# Patient Record
Sex: FEMALE | Race: WHITE | URBAN - METROPOLITAN AREA
[De-identification: names, ages, dates, MRNs, and addresses within clinical notes are randomized per-mention and may not be internally consistent; named-entity substitution may affect disease eponyms.]

---

## 2021-04-20 ENCOUNTER — OFFICE (OUTPATIENT)
Dept: URBAN - METROPOLITAN AREA CLINIC 75 | Facility: CLINIC | Age: 51
End: 2021-04-20

## 2021-04-20 VITALS
DIASTOLIC BLOOD PRESSURE: 90 MMHG | WEIGHT: 145 LBS | TEMPERATURE: 97.3 F | HEIGHT: 67 IN | SYSTOLIC BLOOD PRESSURE: 138 MMHG

## 2021-04-20 DIAGNOSIS — K59.00 CONSTIPATION, UNSPECIFIED: ICD-10-CM

## 2021-04-20 DIAGNOSIS — K64.9 UNSPECIFIED HEMORRHOIDS: ICD-10-CM

## 2021-04-20 DIAGNOSIS — R68.81 EARLY SATIETY: ICD-10-CM

## 2021-04-20 DIAGNOSIS — R14.2 ERUCTATION: ICD-10-CM

## 2021-04-20 DIAGNOSIS — K62.5 HEMORRHAGE OF ANUS AND RECTUM: ICD-10-CM

## 2021-04-20 PROCEDURE — 99204 OFFICE O/P NEW MOD 45 MIN: CPT | Performed by: INTERNAL MEDICINE

## 2021-04-20 RX ORDER — LINACLOTIDE 145 UG/1
145 CAPSULE, GELATIN COATED ORAL
Qty: 90 | Refills: 3 | Status: COMPLETED
Start: 2021-04-20 | End: 2021-04-21 | Stop reason: CLARIF

## 2021-04-27 VITALS
SYSTOLIC BLOOD PRESSURE: 127 MMHG | SYSTOLIC BLOOD PRESSURE: 137 MMHG | OXYGEN SATURATION: 100 % | WEIGHT: 145 LBS | TEMPERATURE: 95.2 F | DIASTOLIC BLOOD PRESSURE: 82 MMHG | DIASTOLIC BLOOD PRESSURE: 95 MMHG | RESPIRATION RATE: 10 BRPM | HEART RATE: 64 BPM | RESPIRATION RATE: 16 BRPM | HEART RATE: 70 BPM | OXYGEN SATURATION: 98 % | RESPIRATION RATE: 23 BRPM | RESPIRATION RATE: 18 BRPM | DIASTOLIC BLOOD PRESSURE: 92 MMHG | DIASTOLIC BLOOD PRESSURE: 96 MMHG | HEART RATE: 67 BPM | HEART RATE: 66 BPM | RESPIRATION RATE: 11 BRPM | SYSTOLIC BLOOD PRESSURE: 124 MMHG | SYSTOLIC BLOOD PRESSURE: 119 MMHG | SYSTOLIC BLOOD PRESSURE: 126 MMHG | HEIGHT: 67 IN | RESPIRATION RATE: 9 BRPM | OXYGEN SATURATION: 99 % | SYSTOLIC BLOOD PRESSURE: 148 MMHG | RESPIRATION RATE: 32 BRPM | TEMPERATURE: 98.3 F | SYSTOLIC BLOOD PRESSURE: 147 MMHG | DIASTOLIC BLOOD PRESSURE: 89 MMHG | SYSTOLIC BLOOD PRESSURE: 146 MMHG | SYSTOLIC BLOOD PRESSURE: 140 MMHG | RESPIRATION RATE: 22 BRPM | SYSTOLIC BLOOD PRESSURE: 155 MMHG | SYSTOLIC BLOOD PRESSURE: 128 MMHG | SYSTOLIC BLOOD PRESSURE: 133 MMHG | HEART RATE: 73 BPM | HEART RATE: 81 BPM | DIASTOLIC BLOOD PRESSURE: 85 MMHG | HEART RATE: 68 BPM | HEART RATE: 75 BPM | DIASTOLIC BLOOD PRESSURE: 83 MMHG | DIASTOLIC BLOOD PRESSURE: 77 MMHG | OXYGEN SATURATION: 97 % | DIASTOLIC BLOOD PRESSURE: 87 MMHG | DIASTOLIC BLOOD PRESSURE: 93 MMHG | DIASTOLIC BLOOD PRESSURE: 80 MMHG

## 2021-05-03 ENCOUNTER — OFFICE (OUTPATIENT)
Dept: URBAN - METROPOLITAN AREA PATHOLOGY 4 | Facility: PATHOLOGY | Age: 51
End: 2021-05-03

## 2021-05-03 ENCOUNTER — AMBULATORY SURGICAL CENTER (OUTPATIENT)
Dept: URBAN - METROPOLITAN AREA SURGERY 17 | Facility: SURGERY | Age: 51
End: 2021-05-03

## 2021-05-03 DIAGNOSIS — K62.5 HEMORRHAGE OF ANUS AND RECTUM: ICD-10-CM

## 2021-05-03 DIAGNOSIS — R14.2 ERUCTATION: ICD-10-CM

## 2021-05-03 DIAGNOSIS — K63.5 POLYP OF COLON: ICD-10-CM

## 2021-05-03 DIAGNOSIS — K59.00 CONSTIPATION, UNSPECIFIED: ICD-10-CM

## 2021-05-03 DIAGNOSIS — K21.00 GASTRO-ESOPHAGEAL REFLUX DISEASE WITH ESOPHAGITIS, WITHOUT B: ICD-10-CM

## 2021-05-03 DIAGNOSIS — R68.81 EARLY SATIETY: ICD-10-CM

## 2021-05-03 DIAGNOSIS — K31.89 OTHER DISEASES OF STOMACH AND DUODENUM: ICD-10-CM

## 2021-05-03 LAB
GI HISTOLOGY: A. SELECT: (no result)
GI HISTOLOGY: B. UNSPECIFIED: (no result)
GI HISTOLOGY: C. UNSPECIFIED: (no result)
GI HISTOLOGY: D. UNSPECIFIED: (no result)
GI HISTOLOGY: PDF REPORT: (no result)

## 2021-05-03 PROCEDURE — 88305 TISSUE EXAM BY PATHOLOGIST: CPT | Performed by: INTERNAL MEDICINE

## 2021-05-03 PROCEDURE — 88342 IMHCHEM/IMCYTCHM 1ST ANTB: CPT | Performed by: INTERNAL MEDICINE

## 2021-05-03 PROCEDURE — 45380 COLONOSCOPY AND BIOPSY: CPT | Performed by: INTERNAL MEDICINE

## 2021-05-03 PROCEDURE — 43239 EGD BIOPSY SINGLE/MULTIPLE: CPT | Performed by: INTERNAL MEDICINE

## 2021-05-03 RX ORDER — OMEPRAZOLE 40 MG/1
40 CAPSULE, DELAYED RELEASE ORAL
Qty: 90 | Refills: 3 | Status: COMPLETED
Start: 2021-05-03 | End: 2021-06-04

## 2021-05-03 NOTE — SERVICEHPINOTES
CARMEN HANLEY  is a  50  female   who presents today for a  EGD-Colonoscopy   for   the indications listed below. The updated Patient Profile was reviewed prior to the procedure, in conjunction with the Physical Exam, including medical conditions, surgical procedures, medications, allergies, family history and social history. See Physical Exam time stamp below for date and time of HPI completion.Pre-operatively, I reviewed the indication(s) for the procedure, the risks of the procedure [including but not limited to: unexpected bleeding possibly requiring hospitalization and/or unplanned repeat procedures, perforation possibly requiring surgical treatment, missed lesions and complications of sedation/MAC (also explained by anesthesia staff)]. I have evaluated the patient for risks associated with the planned anesthesia and the procedure to be performed and find the patient an acceptable candidate for IV sedation.Multiple opportunities were provided for any questions or concerns, and all questions were answered satisfactorily before any anesthesia was administered. We will proceed with the planned procedure.BR

## 2021-06-04 ENCOUNTER — OFFICE (OUTPATIENT)
Dept: URBAN - METROPOLITAN AREA CLINIC 75 | Facility: CLINIC | Age: 51
End: 2021-06-04

## 2021-06-04 VITALS
HEIGHT: 67 IN | TEMPERATURE: 98 F | SYSTOLIC BLOOD PRESSURE: 140 MMHG | DIASTOLIC BLOOD PRESSURE: 78 MMHG | WEIGHT: 146 LBS

## 2021-06-04 DIAGNOSIS — R19.7 DIARRHEA, UNSPECIFIED: ICD-10-CM

## 2021-06-04 DIAGNOSIS — R68.81 EARLY SATIETY: ICD-10-CM

## 2021-06-04 DIAGNOSIS — K59.09 OTHER CONSTIPATION: ICD-10-CM

## 2021-06-04 DIAGNOSIS — R15.0 INCOMPLETE DEFECATION: ICD-10-CM

## 2021-06-04 DIAGNOSIS — Z86.010 PERSONAL HISTORY OF COLONIC POLYPS: ICD-10-CM

## 2021-06-04 PROBLEM — K63.5 POLYP OF COLON: Status: ACTIVE | Noted: 2021-05-03

## 2021-06-04 PROBLEM — K22.8 OTHER SPECIFIED DISEASES OF ESOPHAGUS: Status: ACTIVE | Noted: 2021-05-03

## 2021-06-04 PROBLEM — K31.89 OTHER DISEASES OF STOMACH AND DUODENUM: Status: ACTIVE | Noted: 2021-05-03

## 2021-06-04 PROCEDURE — 99214 OFFICE O/P EST MOD 30 MIN: CPT | Performed by: NURSE PRACTITIONER

## 2021-09-03 ENCOUNTER — OFFICE (OUTPATIENT)
Dept: URBAN - METROPOLITAN AREA CLINIC 75 | Facility: CLINIC | Age: 51
End: 2021-09-03

## 2021-09-03 VITALS
DIASTOLIC BLOOD PRESSURE: 78 MMHG | HEIGHT: 67 IN | WEIGHT: 145 LBS | OXYGEN SATURATION: 99 % | SYSTOLIC BLOOD PRESSURE: 130 MMHG | HEART RATE: 75 BPM

## 2021-09-03 DIAGNOSIS — R14.0 ABDOMINAL DISTENSION (GASEOUS): ICD-10-CM

## 2021-09-03 DIAGNOSIS — R68.81 EARLY SATIETY: ICD-10-CM

## 2021-09-03 DIAGNOSIS — K59.00 CONSTIPATION, UNSPECIFIED: ICD-10-CM

## 2021-09-03 DIAGNOSIS — M62.9 DISORDER OF MUSCLE, UNSPECIFIED: ICD-10-CM

## 2021-09-03 DIAGNOSIS — N81.6 RECTOCELE: ICD-10-CM

## 2021-09-03 PROCEDURE — 99214 OFFICE O/P EST MOD 30 MIN: CPT | Performed by: NURSE PRACTITIONER

## 2023-02-03 ENCOUNTER — TRANSCRIBE ORDERS (OUTPATIENT)
Dept: ADMINISTRATIVE | Facility: HOSPITAL | Age: 53
End: 2023-02-03

## 2024-01-11 NOTE — PROGRESS NOTES
REASON FOR CONSULTATION: Lymphadenopathy  Provide an opinion on any further workup or treatment                             REQUESTING PHYSICIAN: Tabitha Hernandez MD, Alicia Partida MD    RECORDS OBTAINED:  Records of the patients history including those obtained from the referring provider were reviewed and summarized in detail.    HISTORY OF PRESENT ILLNESS:  The patient is a 53 y.o. year old female who is here for an opinion about the above issue.  The patient is a 53-year-old female followed by rheumatology with overlap syndrome-positive KALIN, positive centromere, plus SSA, plus RF, Raynaud's, arthritic symptoms-diagnosis Crest/Sjogren's overlap and started on hydroxychloroquine in March 2023 with some improvement.    Her history includes mononucleosis in high school, subsequent notes and GI symptoms and development of Dengue fever with subsequent arthralgias myalgias and rashes which have persisted ultimately evaluated by Dr. Marrufo of rheumatology 2004 and found positive for thyroid antibodies, Sjogren syndrome and crest antibodies.  Over years she had progressive symptoms particular in January 2021 with hand fatigue and hip pain, outside KALIN 1/3/2020 with mitochondrial antibody positive, and F-actin antibody, KALIN cascade with positive SSA antibody greater than 8, negative SSB, RNP, Partida RNP, SCL 70 and double-stranded DNA, negative RF, elevated FSH, ESR 42, anti-CCP IgG of 3.1 and mild leukopenia with white count of 3000.  Subsequent KALIN titer was 1-2516 centromere pattern, positive centromere antibody, positive Ro 52, R60 antibodies, positive RF and positive thigh antibodies with normal CBC, sed rate of 36.    At her last visit plans were made to obtain a CT of the neck, assess annual SPEP which was - 7/2023, consider sildenafil for Raynaud's if it worsened and continue hydroxychloroquine 200 mg daily.    She was last evaluated 11/20/2023 with increasing fatigue and noted some pain in the left  neck possibly consistent with lymphadenopathy.  She has been using amlodipine for BP with lower extremity edema after this was discontinued and HCTZ was started instead produce increased nocturia.    The patient underwent a CT of the neck 12/21 revealing mildly prominent cervical chain lymph nodes with a right level 2 node at 1.3 cm x 0.9 cm, level 2 cervical chain node at 1.0 x 0.9, no other suspicious findings.    The patient is now referred for findings of lymphadenopathy.  Please see below for extensive review of systems, previous positive PPD history, additional history of previous hand bite from [employer] [employer].          Past Medical History:   Diagnosis Date    History of positive PPD, untreated     Plantar fasciitis     Raynaud's disease     Sicca syndrome     Sjogren's disease         Past Surgical History:   Procedure Laterality Date    WISDOM TOOTH EXTRACTION        Negative EGD, [facility] 2019  Current Outpatient Medications on File Prior to Visit   Medication Sig Dispense Refill    amphetamine-dextroamphetamine (ADDERALL) 15 MG tablet       cholecalciferol (Vitamin D, Cholecalciferol,) 25 MCG (1000 UT) tablet Take 1 tablet by mouth Daily.      ibuprofen (IBU) 800 MG tablet Take  by mouth Every 12 (Twelve) Hours.      melatonin 3 MG tablet Take  by mouth Daily.      metoprolol succinate XL (TOPROL-XL) 25 MG 24 hr tablet Take 2 tablets by mouth Daily.      multivitamin with minerals (MULTIPLE VITAMINS-MINERALS PO) Take  by mouth.      Plaquenil 200 MG tablet Take 1 tablet by mouth Daily.      PROBIOTIC PRODUCT PO Take  by mouth.      valACYclovir (VALTREX) 500 MG tablet Take 1 tablet by mouth Daily.       No current facility-administered medications on file prior to visit.        ALLERGIES:    Allergies   Allergen Reactions    Sulfa Antibiotics Hives    Misc. Sulfonamide Containing Compounds Hives    Naproxen Hives    Naproxen Sodium Hives     Hives, fever, neck pain.    Sulfamethoxazole-Trimethoprim  "Rash        Social History     Socioeconomic History    Marital status: Single   Tobacco Use    Smoking status: Never    Smokeless tobacco: Never   Substance and Sexual Activity    Alcohol use: Yes     Comment: Occasional        Family History   Problem Relation Age of Onset    Brain cancer Mother     Heart disease Father     Alzheimer's disease Father     Prostate cancer Father     Lymphoma Brother     Diabetes Paternal Uncle     Heart disease Paternal Grandfather     Hashimoto's thyroiditis Cousin     Brain cancer Cousin         Review of Systems   Constitutional:  Positive for diaphoresis (Variable, patch, drug related- lisionopril) and unexpected weight change (mininmal). Negative for fever.   HENT:  Positive for ear pain, rhinorrhea, sore throat and trouble swallowing.    Eyes:  Positive for visual disturbance.   Respiratory:  Positive for shortness of breath (obstuctive?).    Cardiovascular:  Positive for palpitations.   Gastrointestinal:  Positive for constipation and rectal pain (Rectal prolapse history).   Endocrine: Positive for heat intolerance.   Genitourinary:  Negative for dysuria and frequency.   Musculoskeletal:  Positive for arthralgias, joint swelling and myalgias.   Skin:  Positive for color change (Raynuads).   Hematological:  Positive for adenopathy (Periodic).        Objective     Vitals:    01/12/24 0919   BP: 152/93   Pulse: 50   Resp: 18   Temp: 97.5 °F (36.4 °C)   TempSrc: Temporal   SpO2: 100%   Weight: 68.9 kg (151 lb 12.8 oz)   Height: 170.2 cm (67\")   PainSc:   6   PainLoc: Back  Comment: lower back/ joint         1/12/2024     9:20 AM   Current Status   ECOG score 0       Physical Exam  Constitutional:       Appearance: Normal appearance. She is normal weight.   HENT:      Head: Normocephalic and atraumatic.      Right Ear: Tympanic membrane, ear canal and external ear normal.      Left Ear: Tympanic membrane, ear canal and external ear normal.      Nose: Nose normal.      " Mouth/Throat:      Mouth: Mucous membranes are moist.      Pharynx: Oropharynx is clear.   Eyes:      Extraocular Movements: Extraocular movements intact.      Conjunctiva/sclera: Conjunctivae normal.      Pupils: Pupils are equal, round, and reactive to light.   Neck:      Comments: Scattered sites-shotty adenopathy particularly in cervical chains bilaterally.  Cardiovascular:      Rate and Rhythm: Normal rate and regular rhythm.      Pulses: Normal pulses.      Heart sounds: Normal heart sounds.   Pulmonary:      Effort: Pulmonary effort is normal.      Breath sounds: Rales (Dry rales bilateral lung fields) present.   Abdominal:      General: Bowel sounds are normal.      Palpations: Abdomen is soft.   Musculoskeletal:         General: Normal range of motion.      Cervical back: Normal range of motion and neck supple.   Skin:     General: Skin is warm and dry.   Neurological:      General: No focal deficit present.      Mental Status: She is oriented to person, place, and time.   Psychiatric:         Mood and Affect: Mood normal.         Behavior: Behavior normal.           RECENT LABS:  Hematology WBC   Date Value Ref Range Status   01/12/2024 5.53 3.40 - 10.80 10*3/mm3 Final   01/07/2022 4.78 4.5 - 11.0 10*3/uL Final     RBC   Date Value Ref Range Status   01/12/2024 5.14 3.77 - 5.28 10*6/mm3 Final   01/07/2022 5.23 (H) 4.0 - 5.2 10*6/uL Final     Hemoglobin   Date Value Ref Range Status   01/12/2024 14.7 12.0 - 15.9 g/dL Final   01/07/2022 14.8 12.0 - 16.0 g/dL Final     Hematocrit   Date Value Ref Range Status   01/12/2024 43.3 34.0 - 46.6 % Final   01/07/2022 46.3 (H) 36.0 - 46.0 % Final     Platelets   Date Value Ref Range Status   01/12/2024 249 140 - 450 10*3/mm3 Final   01/07/2022 325 140 - 440 10*3/uL Final          Assessment & Plan   53-year-old female with long-term history of rheumatologic disorder-overlap syndrome followed over 20+ year history by rheumatology with recent increased symptoms  involving her hands and associated Raynaud's.  She is been treated consistently with hydroxychloroquine to better effect, recent studies are listed as above from rheumatology and exam had suggested potential neck lymphadenopathy.    Her subsequent neck CT demonstrated mildly prominent lymphadenopathy as described and extensive review of systems today is more consistent with rheumatologic disease rather than development of lymphoma.    Peripheral smear-normocytic normochromic RBCs without morphologic abnormality, leukocytes appear normal morphologically without atypical forms or immature forms present, platelets per normal per number and size.    Physical exam otherwise unrevealing in terms of hepatosplenomegaly or peripheral lymphadenopathy except, modestly, in the neck.     After extensive review and consideration family history, past exposures and current occupational risk, as well as patient's current symptoms plan:    *Return to laboratory for LDH, JAMIE, PE, free serum light chains, CRP, ESR, CMP, TSH, peripheral blood flow cytometry, QuantiFERON gold    *Noncontrasted CT of chest, abdomen, pelvis    *MD follow-up 1 week after scans are completed

## 2024-01-12 ENCOUNTER — LAB (OUTPATIENT)
Dept: LAB | Facility: HOSPITAL | Age: 54
End: 2024-01-12
Payer: COMMERCIAL

## 2024-01-12 ENCOUNTER — PRIOR AUTHORIZATION (OUTPATIENT)
Dept: ONCOLOGY | Facility: CLINIC | Age: 54
End: 2024-01-12
Payer: COMMERCIAL

## 2024-01-12 ENCOUNTER — CONSULT (OUTPATIENT)
Dept: ONCOLOGY | Facility: CLINIC | Age: 54
End: 2024-01-12
Payer: COMMERCIAL

## 2024-01-12 VITALS
DIASTOLIC BLOOD PRESSURE: 93 MMHG | TEMPERATURE: 97.5 F | OXYGEN SATURATION: 100 % | SYSTOLIC BLOOD PRESSURE: 152 MMHG | HEART RATE: 50 BPM | WEIGHT: 151.8 LBS | BODY MASS INDEX: 23.83 KG/M2 | HEIGHT: 67 IN | RESPIRATION RATE: 18 BRPM

## 2024-01-12 DIAGNOSIS — R59.1 LYMPHADENOPATHY: Primary | ICD-10-CM

## 2024-01-12 LAB
ALBUMIN SERPL-MCNC: 4.4 G/DL (ref 3.5–5.2)
ALBUMIN/GLOB SERPL: 1.3 G/DL
ALP SERPL-CCNC: 73 U/L (ref 39–117)
ALT SERPL W P-5'-P-CCNC: 9 U/L (ref 1–33)
ANION GAP SERPL CALCULATED.3IONS-SCNC: 9.1 MMOL/L (ref 5–15)
AST SERPL-CCNC: 23 U/L (ref 1–32)
BASOPHILS # BLD AUTO: 0.05 10*3/MM3 (ref 0–0.2)
BASOPHILS NFR BLD AUTO: 0.9 % (ref 0–1.5)
BILIRUB SERPL-MCNC: 0.5 MG/DL (ref 0–1.2)
BUN SERPL-MCNC: 15 MG/DL (ref 6–20)
BUN/CREAT SERPL: 16.7 (ref 7–25)
CALCIUM SPEC-SCNC: 9.8 MG/DL (ref 8.6–10.5)
CHLORIDE SERPL-SCNC: 101 MMOL/L (ref 98–107)
CO2 SERPL-SCNC: 28.9 MMOL/L (ref 22–29)
CREAT SERPL-MCNC: 0.9 MG/DL (ref 0.57–1)
CRP SERPL-MCNC: <0.3 MG/DL (ref 0–0.5)
DEPRECATED RDW RBC AUTO: 37.4 FL (ref 37–54)
EGFRCR SERPLBLD CKD-EPI 2021: 77.1 ML/MIN/1.73
EOSINOPHIL # BLD AUTO: 0.1 10*3/MM3 (ref 0–0.4)
EOSINOPHIL NFR BLD AUTO: 1.8 % (ref 0.3–6.2)
ERYTHROCYTE [DISTWIDTH] IN BLOOD BY AUTOMATED COUNT: 12.4 % (ref 12.3–15.4)
ERYTHROCYTE [SEDIMENTATION RATE] IN BLOOD: 7 MM/HR (ref 0–30)
GLOBULIN UR ELPH-MCNC: 3.5 GM/DL
GLUCOSE SERPL-MCNC: 100 MG/DL (ref 65–99)
HCT VFR BLD AUTO: 43.3 % (ref 34–46.6)
HGB BLD-MCNC: 14.7 G/DL (ref 12–15.9)
IMM GRANULOCYTES # BLD AUTO: 0.02 10*3/MM3 (ref 0–0.05)
IMM GRANULOCYTES NFR BLD AUTO: 0.4 % (ref 0–0.5)
LDH SERPL-CCNC: 162 U/L (ref 135–214)
LYMPHOCYTES # BLD AUTO: 1.91 10*3/MM3 (ref 0.7–3.1)
LYMPHOCYTES NFR BLD AUTO: 34.5 % (ref 19.6–45.3)
MCH RBC QN AUTO: 28.6 PG (ref 26.6–33)
MCHC RBC AUTO-ENTMCNC: 33.9 G/DL (ref 31.5–35.7)
MCV RBC AUTO: 84.2 FL (ref 79–97)
MONOCYTES # BLD AUTO: 0.64 10*3/MM3 (ref 0.1–0.9)
MONOCYTES NFR BLD AUTO: 11.6 % (ref 5–12)
NEUTROPHILS NFR BLD AUTO: 2.81 10*3/MM3 (ref 1.7–7)
NEUTROPHILS NFR BLD AUTO: 50.8 % (ref 42.7–76)
NRBC BLD AUTO-RTO: 0 /100 WBC (ref 0–0.2)
PLATELET # BLD AUTO: 249 10*3/MM3 (ref 140–450)
PMV BLD AUTO: 9.3 FL (ref 6–12)
POTASSIUM SERPL-SCNC: 4.7 MMOL/L (ref 3.5–5.2)
PROT SERPL-MCNC: 7.9 G/DL (ref 6–8.5)
RBC # BLD AUTO: 5.14 10*6/MM3 (ref 3.77–5.28)
SODIUM SERPL-SCNC: 139 MMOL/L (ref 136–145)
TSH SERPL DL<=0.05 MIU/L-ACNC: 1.62 UIU/ML (ref 0.27–4.2)
WBC NRBC COR # BLD AUTO: 5.53 10*3/MM3 (ref 3.4–10.8)

## 2024-01-12 PROCEDURE — 36415 COLL VENOUS BLD VENIPUNCTURE: CPT

## 2024-01-12 PROCEDURE — 88185 FLOWCYTOMETRY/TC ADD-ON: CPT | Performed by: INTERNAL MEDICINE

## 2024-01-12 PROCEDURE — 83615 LACTATE (LD) (LDH) ENZYME: CPT | Performed by: INTERNAL MEDICINE

## 2024-01-12 PROCEDURE — 88184 FLOWCYTOMETRY/ TC 1 MARKER: CPT | Performed by: INTERNAL MEDICINE

## 2024-01-12 PROCEDURE — 84443 ASSAY THYROID STIM HORMONE: CPT | Performed by: INTERNAL MEDICINE

## 2024-01-12 PROCEDURE — 80053 COMPREHEN METABOLIC PANEL: CPT | Performed by: INTERNAL MEDICINE

## 2024-01-12 PROCEDURE — 85025 COMPLETE CBC W/AUTO DIFF WBC: CPT

## 2024-01-12 PROCEDURE — 85652 RBC SED RATE AUTOMATED: CPT | Performed by: INTERNAL MEDICINE

## 2024-01-12 PROCEDURE — 88182 CELL MARKER STUDY: CPT | Performed by: INTERNAL MEDICINE

## 2024-01-12 PROCEDURE — 86140 C-REACTIVE PROTEIN: CPT | Performed by: INTERNAL MEDICINE

## 2024-01-12 RX ORDER — LANOLIN ALCOHOL/MO/W.PET/CERES
CREAM (GRAM) TOPICAL DAILY
COMMUNITY

## 2024-01-12 RX ORDER — MULTIPLE VITAMINS W/ MINERALS TAB 9MG-400MCG
TAB ORAL
COMMUNITY

## 2024-01-12 RX ORDER — VALACYCLOVIR HYDROCHLORIDE 500 MG/1
500 TABLET, FILM COATED ORAL DAILY
COMMUNITY
Start: 2023-12-01

## 2024-01-12 RX ORDER — IBUPROFEN 800 MG/1
TABLET ORAL EVERY 12 HOURS SCHEDULED
COMMUNITY

## 2024-01-12 RX ORDER — DEXTROAMPHETAMINE SACCHARATE, AMPHETAMINE ASPARTATE, DEXTROAMPHETAMINE SULFATE AND AMPHETAMINE SULFATE 3.75; 3.75; 3.75; 3.75 MG/1; MG/1; MG/1; MG/1
TABLET ORAL
COMMUNITY
Start: 2023-12-11

## 2024-01-12 RX ORDER — MELATONIN
1000 DAILY
COMMUNITY

## 2024-01-12 RX ORDER — HYDROXYCHLOROQUINE SULFATE 200 MG/1
1 TABLET ORAL DAILY
COMMUNITY
Start: 2023-07-20

## 2024-01-12 RX ORDER — METOPROLOL SUCCINATE 25 MG/1
50 TABLET, EXTENDED RELEASE ORAL DAILY
COMMUNITY
Start: 2024-01-11

## 2024-01-12 NOTE — TELEPHONE ENCOUNTER
No PA required for Flow [id number] per [employer]. Ref # [id number]. Ok'd lab to send to [facility] today.

## 2024-01-15 LAB
ALBUMIN SERPL ELPH-MCNC: 4.1 G/DL (ref 2.9–4.4)
ALBUMIN/GLOB SERPL: 1.3 {RATIO} (ref 0.7–1.7)
ALPHA1 GLOB SERPL ELPH-MCNC: 0.2 G/DL (ref 0–0.4)
ALPHA2 GLOB SERPL ELPH-MCNC: 0.7 G/DL (ref 0.4–1)
B-GLOBULIN SERPL ELPH-MCNC: 1.3 G/DL (ref 0.7–1.3)
GAMMA GLOB SERPL ELPH-MCNC: 1.2 G/DL (ref 0.4–1.8)
GLOBULIN SER-MCNC: 3.3 G/DL (ref 2.2–3.9)
IGA SERPL-MCNC: 594 MG/DL (ref 87–352)
IGG SERPL-MCNC: 1386 MG/DL (ref 586–1602)
IGM SERPL-MCNC: 87 MG/DL (ref 26–217)
INTERPRETATION SERPL IEP-IMP: ABNORMAL
KAPPA LC FREE SER-MCNC: 34.5 MG/L (ref 3.3–19.4)
KAPPA LC FREE/LAMBDA FREE SER: 1.41 {RATIO} (ref 0.26–1.65)
LABORATORY COMMENT REPORT: ABNORMAL
LAMBDA LC FREE SERPL-MCNC: 24.5 MG/L (ref 5.7–26.3)
M PROTEIN SERPL ELPH-MCNC: ABNORMAL G/DL
PROT SERPL-MCNC: 7.4 G/DL (ref 6–8.5)
REF LAB TEST METHOD: NORMAL

## 2024-01-16 LAB
GAMMA INTERFERON BACKGROUND BLD IA-ACNC: 0 IU/ML
M TB IFN-G BLD-IMP: NEGATIVE
M TB IFN-G CD4+ T-CELLS BLD-ACNC: 0 IU/ML
M TBIFN-G CD4+ CD8+T-CELLS BLD-ACNC: 0 IU/ML
MITOGEN IGNF BLD-ACNC: >10 IU/ML
QUANTIFERON INCUBATION: NORMAL
SERVICE CMNT-IMP: NORMAL

## 2024-01-18 ENCOUNTER — HOSPITAL ENCOUNTER (OUTPATIENT)
Facility: HOSPITAL | Age: 54
Discharge: HOME | End: 2024-01-18
Admitting: INTERNAL MEDICINE
Payer: COMMERCIAL

## 2024-01-18 DIAGNOSIS — R59.1 LYMPHADENOPATHY: ICD-10-CM

## 2024-01-18 PROCEDURE — 0 DIATRIZOATE MEGLUMINE & SODIUM PER 1 ML: Performed by: INTERNAL MEDICINE

## 2024-01-18 PROCEDURE — 74176 CT ABD & PELVIS W/O CONTRAST: CPT

## 2024-01-18 PROCEDURE — 71250 CT THORAX DX C-: CPT

## 2024-01-18 RX ADMIN — DIATRIZOATE MEGLUMINE AND DIATRIZOATE SODIUM 30 ML: 600; 100 SOLUTION ORAL; RECTAL at 14:05

## 2024-01-24 NOTE — PROGRESS NOTES
REASON FOR FOLLOW-UP: Lymphadenopathy    HISTORY OF PRESENT ILLNESS:  The patient is a 53 y.o. year old female who is here for an opinion about the above issue.  The patient is a 53-year-old female followed by rheumatology with overlap syndrome-positive KALIN, positive centromere, plus SSA, plus RF, Raynaud's, arthritic symptoms-diagnosis Crest/Sjogren's overlap and started on hydroxychloroquine in March 2023 with some improvement.    Her history includes mononucleosis in high school, subsequent notes and GI symptoms and development of Dengue fever with subsequent arthralgias myalgias and rashes which have persisted ultimately evaluated by Dr. Marrufo of rheumatology 2004 and found positive for thyroid antibodies, Sjogren syndrome and crest antibodies.  Over years she had progressive symptoms particular in January 2021 with hand fatigue and hip pain, outside KALIN 1/3/2020 with mitochondrial antibody positive, and F-actin antibody, KALIN cascade with positive SSA antibody greater than 8, negative SSB, RNP, Partida RNP, SCL 70 and double-stranded DNA, negative RF, elevated FSH, ESR 42, anti-CCP IgG of 3.1 and mild leukopenia with white count of 3000.  Subsequent KALIN titer was 1-2516 centromere pattern, positive centromere antibody, positive Ro 52, R60 antibodies, positive RF and positive thigh antibodies with normal CBC, sed rate of 36.    At her last visit plans were made to obtain a CT of the neck, assess annual SPEP which was - 7/2023, consider sildenafil for Raynaud's if it worsened and continue hydroxychloroquine 200 mg daily.    She was last evaluated 11/20/2023 with increasing fatigue and noted some pain in the left neck possibly consistent with lymphadenopathy.  She has been using amlodipine for BP with lower extremity edema after this was discontinued and HCTZ was started instead produce increased nocturia.    The patient underwent a CT of the neck 12/21 revealing mildly prominent cervical chain lymph nodes  with a right level 2 node at 1.3 cm x 0.9 cm, level 2 cervical chain node at 1.0 x 0.9, no other suspicious findings.    The patient is now referred for findings of lymphadenopathy.  Please see below for extensive review of systems, previous positive PPD history, additional history of previous hand bite from [employer] [employer].    After lengthy discussion the patient underwent testing including LDH at 162, JAMIE, PE, free serum light chains with IgA increased to 594, no monoclonal spike, kappa/lambda ratio of 1.41, negative testing for QuantiFERON gold, sed rate of 7, CRP less than 0, normal TSH at 1.620, normal CMP, flow cytometry with no evidence of monoclonal B-cell population or T-cell abnormal phenotype, no plasma cell increase or excess blasts.  CT of chest abdomen and pelvis reveals right apical pleural-parenchymal scarring, small pulmonary nodules, no suggestion of any lymphadenopathy, abnormalities per liver or spleen.  There are degenerative changes noted and bone windows but no suspicious lesion.  Patient has no evidence of lymphoma.  She does, incidentally, describe a fungal outbreak in portions of the plumbing of her home which is now corrected but which had produced sore throat symptoms for several days it is around the time this general development of lymphadenopathy had started.          Past Medical History:   Diagnosis Date    History of positive PPD, untreated     Plantar fasciitis     Raynaud's disease     Sicca syndrome     Sjogren's disease         Past Surgical History:   Procedure Laterality Date    WISDOM TOOTH EXTRACTION        Negative EGD, [facility] 2019  Current Outpatient Medications on File Prior to Visit   Medication Sig Dispense Refill    amphetamine-dextroamphetamine (ADDERALL) 15 MG tablet       cholecalciferol (Vitamin D, Cholecalciferol,) 25 MCG (1000 UT) tablet Take 1 tablet by mouth Daily.      ibuprofen (IBU) 800 MG tablet Take  by mouth Every 12 (Twelve) Hours.      melatonin 3  MG tablet Take  by mouth Daily.      metoprolol succinate XL (TOPROL-XL) 25 MG 24 hr tablet Take 2 tablets by mouth Daily.      multivitamin with minerals (MULTIPLE VITAMINS-MINERALS PO) Take  by mouth.      Plaquenil 200 MG tablet Take 1 tablet by mouth Daily.      PROBIOTIC PRODUCT PO Take  by mouth.      valACYclovir (VALTREX) 500 MG tablet Take 1 tablet by mouth Daily.       No current facility-administered medications on file prior to visit.        ALLERGIES:    Allergies   Allergen Reactions    Sulfa Antibiotics Hives    Misc. Sulfonamide Containing Compounds Hives    Naproxen Hives    Naproxen Sodium Hives     Hives, fever, neck pain.    Sulfamethoxazole-Trimethoprim Rash        Social History     Socioeconomic History    Marital status: Single   Tobacco Use    Smoking status: Never    Smokeless tobacco: Never   Substance and Sexual Activity    Alcohol use: Yes     Comment: Occasional        Family History   Problem Relation Age of Onset    Brain cancer Mother     Heart disease Father     Alzheimer's disease Father     Prostate cancer Father     Lymphoma Brother     Diabetes Paternal Uncle     Heart disease Paternal Grandfather     Hashimoto's thyroiditis Cousin     Brain cancer Cousin         Review of Systems   Constitutional:  Positive for diaphoresis (Variable, patch, drug related- lisionopril) and unexpected weight change (mininmal). Negative for fever.   HENT:  Positive for ear pain, rhinorrhea, sore throat and trouble swallowing.    Eyes:  Positive for visual disturbance.   Respiratory:  Positive for shortness of breath (obstuctive?).    Cardiovascular:  Positive for palpitations.   Gastrointestinal:  Positive for constipation and rectal pain (Rectal prolapse history).   Endocrine: Positive for heat intolerance.   Genitourinary:  Negative for dysuria and frequency.   Musculoskeletal:  Positive for arthralgias, joint swelling and myalgias.   Skin:  Positive for color change (Raynuads).   Hematological:   Positive for adenopathy (Periodic).        Objective     There were no vitals filed for this visit.        1/12/2024     9:20 AM   Current Status   ECOG score 0       Physical Exam  Constitutional:       Appearance: Normal appearance. She is normal weight.   HENT:      Head: Normocephalic and atraumatic.      Right Ear: Tympanic membrane, ear canal and external ear normal.      Left Ear: Tympanic membrane, ear canal and external ear normal.      Nose: Nose normal.      Mouth/Throat:      Mouth: Mucous membranes are moist.      Pharynx: Oropharynx is clear.   Eyes:      Extraocular Movements: Extraocular movements intact.      Conjunctiva/sclera: Conjunctivae normal.      Pupils: Pupils are equal, round, and reactive to light.   Neck:      Comments: Scattered sites-shotty adenopathy particularly in cervical chains bilaterally.  Cardiovascular:      Rate and Rhythm: Normal rate and regular rhythm.      Pulses: Normal pulses.      Heart sounds: Normal heart sounds.   Pulmonary:      Effort: Pulmonary effort is normal.      Breath sounds: Rales (Dry rales bilateral lung fields) present.   Abdominal:      General: Bowel sounds are normal.      Palpations: Abdomen is soft.   Musculoskeletal:         General: Normal range of motion.      Cervical back: Normal range of motion and neck supple.   Skin:     General: Skin is warm and dry.   Neurological:      General: No focal deficit present.      Mental Status: She is oriented to person, place, and time.   Psychiatric:         Mood and Affect: Mood normal.         Behavior: Behavior normal.           RECENT LABS:  Hematology WBC   Date Value Ref Range Status   01/12/2024 5.53 3.40 - 10.80 10*3/mm3 Final   01/07/2022 4.78 4.5 - 11.0 10*3/uL Final     RBC   Date Value Ref Range Status   01/12/2024 5.14 3.77 - 5.28 10*6/mm3 Final   01/07/2022 5.23 (H) 4.0 - 5.2 10*6/uL Final     Hemoglobin   Date Value Ref Range Status   01/12/2024 14.7 12.0 - 15.9 g/dL Final   01/07/2022 14.8  12.0 - 16.0 g/dL Final     Hematocrit   Date Value Ref Range Status   01/12/2024 43.3 34.0 - 46.6 % Final   01/07/2022 46.3 (H) 36.0 - 46.0 % Final     Platelets   Date Value Ref Range Status   01/12/2024 249 140 - 450 10*3/mm3 Final   01/07/2022 325 140 - 440 10*3/uL Final          Assessment & Plan   53-year-old female with long-term history of rheumatologic disorder-overlap syndrome followed over 20+ year history by rheumatology with recent increased symptoms involving her hands and associated Raynaud's.  She is been treated consistently with hydroxychloroquine to better effect, recent studies are listed as above from rheumatology and exam had suggested potential neck lymphadenopathy.    Her subsequent neck CT demonstrated mildly prominent lymphadenopathy as described and extensive review of systems today is more consistent with rheumatologic disease rather than development of lymphoma.    Peripheral smear-normocytic normochromic RBCs without morphologic abnormality, leukocytes appear normal morphologically without atypical forms or immature forms present, platelets per normal per number and size.    Physical exam otherwise unrevealing in terms of hepatosplenomegaly or peripheral lymphadenopathy except, modestly, in the neck.     After extensive review and consideration family history, past exposures and current occupational risk, as well as patient's current symptoms, the patient underwent testing including LDH at 162, JAMIE, PE, free serum light chains with IgA increased to 594, no monoclonal spike, kappa/lambda ratio of 1.41, negative testing for QuantiFERON gold, sed rate of 7, CRP less than 0, normal TSH at 1.620, normal CMP, flow cytometry with no evidence of monoclonal B-cell population or T-cell abnormal phenotype, no plasma cell increase or excess blasts.  CT of chest abdomen and pelvis reveals right apical pleural-parenchymal scarring, small pulmonary nodules, no suggestion of any lymphadenopathy,  abnormalities per liver or spleen.  There are degenerative changes noted and bone windows but no suspicious lesion.  Patient has no evidence of lymphoma.  She does, incidentally, describe a fungal outbreak in portions of the plumbing of her home which is now corrected but which had produced sore throat symptoms for several days it is around the time this general development of lymphadenopathy had started.    Assessment plan.  *The patient previously noted lymphadenopathy is felt to be reactive in nature.  There is no suggestion of an underlying lymphoproliferative disorder.    *Her hyperreactive lymphadenopathy initially assessed very well have been related to exposures in her home as described above.    *Patient will follow-up with rheumatology as planned.    *Patient be discharged from [facility] office.  Thank you very much for allow us to see in consultation and please let me know if there are questions concerning this case.

## 2024-01-25 ENCOUNTER — OFFICE VISIT (OUTPATIENT)
Dept: ONCOLOGY | Facility: CLINIC | Age: 54
End: 2024-01-25
Payer: COMMERCIAL

## 2024-01-25 ENCOUNTER — LAB (OUTPATIENT)
Dept: LAB | Facility: HOSPITAL | Age: 54
End: 2024-01-25
Payer: COMMERCIAL

## 2024-01-25 VITALS
OXYGEN SATURATION: 98 % | SYSTOLIC BLOOD PRESSURE: 136 MMHG | HEIGHT: 67 IN | RESPIRATION RATE: 18 BRPM | HEART RATE: 69 BPM | DIASTOLIC BLOOD PRESSURE: 85 MMHG | WEIGHT: 150.9 LBS | BODY MASS INDEX: 23.69 KG/M2 | TEMPERATURE: 96.3 F

## 2024-01-25 DIAGNOSIS — R59.1 LYMPHADENOPATHY: Primary | ICD-10-CM

## 2024-01-25 DIAGNOSIS — R59.1 LYMPHADENOPATHY: ICD-10-CM

## 2024-01-25 LAB
BASOPHILS # BLD AUTO: 0.03 10*3/MM3 (ref 0–0.2)
BASOPHILS NFR BLD AUTO: 0.7 % (ref 0–1.5)
DEPRECATED RDW RBC AUTO: 37.8 FL (ref 37–54)
EOSINOPHIL # BLD AUTO: 0.06 10*3/MM3 (ref 0–0.4)
EOSINOPHIL NFR BLD AUTO: 1.4 % (ref 0.3–6.2)
ERYTHROCYTE [DISTWIDTH] IN BLOOD BY AUTOMATED COUNT: 12.5 % (ref 12.3–15.4)
HCT VFR BLD AUTO: 42.8 % (ref 34–46.6)
HGB BLD-MCNC: 14.6 G/DL (ref 12–15.9)
IMM GRANULOCYTES # BLD AUTO: 0.09 10*3/MM3 (ref 0–0.05)
IMM GRANULOCYTES NFR BLD AUTO: 2.1 % (ref 0–0.5)
LYMPHOCYTES # BLD AUTO: 1.64 10*3/MM3 (ref 0.7–3.1)
LYMPHOCYTES NFR BLD AUTO: 37.4 % (ref 19.6–45.3)
MCH RBC QN AUTO: 28.7 PG (ref 26.6–33)
MCHC RBC AUTO-ENTMCNC: 34.1 G/DL (ref 31.5–35.7)
MCV RBC AUTO: 84.1 FL (ref 79–97)
MONOCYTES # BLD AUTO: 0.46 10*3/MM3 (ref 0.1–0.9)
MONOCYTES NFR BLD AUTO: 10.5 % (ref 5–12)
NEUTROPHILS NFR BLD AUTO: 2.11 10*3/MM3 (ref 1.7–7)
NEUTROPHILS NFR BLD AUTO: 47.9 % (ref 42.7–76)
NRBC BLD AUTO-RTO: 0 /100 WBC (ref 0–0.2)
PLATELET # BLD AUTO: 285 10*3/MM3 (ref 140–450)
PMV BLD AUTO: 8.9 FL (ref 6–12)
RBC # BLD AUTO: 5.09 10*6/MM3 (ref 3.77–5.28)
WBC NRBC COR # BLD AUTO: 4.39 10*3/MM3 (ref 3.4–10.8)

## 2024-01-25 PROCEDURE — 85025 COMPLETE CBC W/AUTO DIFF WBC: CPT

## 2024-01-25 PROCEDURE — 99214 OFFICE O/P EST MOD 30 MIN: CPT | Performed by: INTERNAL MEDICINE

## 2024-01-25 PROCEDURE — 36415 COLL VENOUS BLD VENIPUNCTURE: CPT

## 2024-02-02 ENCOUNTER — TRANSCRIBE ORDERS (OUTPATIENT)
Dept: ADMINISTRATIVE | Facility: HOSPITAL | Age: 54
End: 2024-02-02
Payer: COMMERCIAL

## 2024-02-02 DIAGNOSIS — R91.8 PULMONARY NODULES: Primary | ICD-10-CM

## 2024-02-16 ENCOUNTER — HOSPITAL ENCOUNTER (OUTPATIENT)
Dept: PET IMAGING | Facility: HOSPITAL | Age: 54
Discharge: HOME | End: 2024-02-16
Admitting: INTERNAL MEDICINE
Payer: COMMERCIAL

## 2024-02-16 DIAGNOSIS — R91.8 PULMONARY NODULES: ICD-10-CM

## 2024-02-16 PROCEDURE — 71250 CT THORAX DX C-: CPT

## 2024-03-05 ENCOUNTER — TRANSCRIBE ORDERS (OUTPATIENT)
Dept: ADMINISTRATIVE | Facility: HOSPITAL | Age: 54
End: 2024-03-05
Payer: COMMERCIAL

## 2024-04-03 ENCOUNTER — TRANSCRIBE ORDERS (OUTPATIENT)
Dept: ADMINISTRATIVE | Facility: HOSPITAL | Age: 54
End: 2024-04-03
Payer: COMMERCIAL

## 2024-04-03 DIAGNOSIS — R91.8 LUNG NODULES: Primary | ICD-10-CM

## 2025-03-13 ENCOUNTER — TRANSCRIBE ORDERS (OUTPATIENT)
Dept: ADMINISTRATIVE | Facility: HOSPITAL | Age: 55
End: 2025-03-13
Payer: COMMERCIAL

## 2025-03-13 DIAGNOSIS — R91.1 LUNG NODULE: Primary | ICD-10-CM
